# Patient Record
Sex: FEMALE | Race: WHITE
[De-identification: names, ages, dates, MRNs, and addresses within clinical notes are randomized per-mention and may not be internally consistent; named-entity substitution may affect disease eponyms.]

---

## 2020-02-01 ENCOUNTER — HOSPITAL ENCOUNTER (OUTPATIENT)
Dept: HOSPITAL 53 - M SFHCLERA | Age: 25
End: 2020-02-01
Attending: PHYSICIAN ASSISTANT
Payer: COMMERCIAL

## 2020-02-01 DIAGNOSIS — J02.9: Primary | ICD-10-CM

## 2020-08-25 ENCOUNTER — HOSPITAL ENCOUNTER (OUTPATIENT)
Dept: HOSPITAL 53 - M LRY | Age: 25
End: 2020-08-25
Attending: NURSE PRACTITIONER
Payer: COMMERCIAL
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
## 2020-08-25 DIAGNOSIS — S92.515A: Primary | ICD-10-CM
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
## 2020-10-21 NOTE — REP
INDICATION:

FOOT PAIN



COMPARISON:

None.



TECHNIQUE:

AP, lateral, bilateral oblique views left foot.



FINDINGS:

Note: Examination is made available on PACs for interpretation on 10/21/2020.



A single oblique view demonstrates a very subtle nondisplaced fracture through the

midshaft of the 5th toe proximal phalanx.  Correlation with physical examination and

history/mechanism of injury recommended.



IMPRESSION:

Single view demonstrates a very subtle nondisplaced fracture through the mid shaft of

the 5th proximal phalanx.



<Electronically signed by Lazaro Ellis > 10/21/20 1122